# Patient Record
Sex: MALE | Race: WHITE
[De-identification: names, ages, dates, MRNs, and addresses within clinical notes are randomized per-mention and may not be internally consistent; named-entity substitution may affect disease eponyms.]

---

## 2018-05-19 ENCOUNTER — HOSPITAL ENCOUNTER (INPATIENT)
Dept: HOSPITAL 4 - SED | Age: 28
LOS: 1 days | Discharge: HOME | DRG: 301 | End: 2018-05-20
Attending: INTERNAL MEDICINE | Admitting: INTERNAL MEDICINE
Payer: COMMERCIAL

## 2018-05-19 VITALS — BODY MASS INDEX: 30.52 KG/M2 | HEIGHT: 71 IN | WEIGHT: 218 LBS

## 2018-05-19 VITALS — SYSTOLIC BLOOD PRESSURE: 152 MMHG

## 2018-05-19 DIAGNOSIS — E66.9: ICD-10-CM
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
## 2018-05-19 DIAGNOSIS — Z79.01: ICD-10-CM

## 2018-05-19 DIAGNOSIS — I82.432: Primary | ICD-10-CM

## 2018-05-19 NOTE — NUR
-------------------------------------------------------------------------------

           *** Note ifeanyione in EDM - 05/19/18 at 2111 by [email or web address] ***            

-------------------------------------------------------------------------------

Patient triaged and placed in waiting room. VSS and patient appears in no acute 
distress at this time. Accompanied by Daughter, awaiting available bed, and MD 
notified of need for MSE.

## 2018-05-19 NOTE — NUR
Patient triaged and placed in waiting room. VSS and patient appears in no acute 
distress at this time. Accompanied by girlfriend, awaiting available bed, and 
MD notified of need for MSE.

## 2018-05-20 VITALS — SYSTOLIC BLOOD PRESSURE: 122 MMHG

## 2018-05-20 VITALS — SYSTOLIC BLOOD PRESSURE: 100 MMHG

## 2018-05-20 VITALS — SYSTOLIC BLOOD PRESSURE: 126 MMHG

## 2018-05-20 VITALS — SYSTOLIC BLOOD PRESSURE: 120 MMHG

## 2018-05-20 LAB
ALBUMIN SERPL BCP-MCNC: 4.1 G/DL (ref 3.4–4.8)
ALT SERPL W P-5'-P-CCNC: 50 U/L (ref 12–78)
ANION GAP SERPL CALCULATED.3IONS-SCNC: 3 MMOL/L (ref 5–15)
AST SERPL W P-5'-P-CCNC: 27 U/L (ref 10–37)
BASOPHILS # BLD AUTO: 0.1 K/UL (ref 0–0.2)
BASOPHILS NFR BLD AUTO: 0.8 % (ref 0–2)
BILIRUB SERPL-MCNC: 0.3 MG/DL (ref 0–1)
BUN SERPL-MCNC: 17 MG/DL (ref 8–21)
CALCIUM SERPL-MCNC: 9.6 MG/DL (ref 8.4–11)
CHLORIDE SERPL-SCNC: 106 MMOL/L (ref 98–107)
CREAT SERPL-MCNC: 0.99 MG/DL (ref 0.55–1.3)
EOSINOPHIL # BLD AUTO: 0.2 K/UL (ref 0–0.4)
EOSINOPHIL NFR BLD AUTO: 2.3 % (ref 0–4)
ERYTHROCYTE [DISTWIDTH] IN BLOOD BY AUTOMATED COUNT: 11.5 % (ref 9–15)
GFR SERPL CREATININE-BSD FRML MDRD: 118 ML/MIN (ref 90–?)
GLUCOSE SERPL-MCNC: 112 MG/DL (ref 70–99)
HCT VFR BLD AUTO: 46.1 % (ref 36–54)
HGB BLD-MCNC: 15.9 G/DL (ref 14–18)
INR PPP: 1 (ref 0.8–1.2)
LYMPHOCYTES # BLD AUTO: 2.5 K/UL (ref 1–5.5)
LYMPHOCYTES NFR BLD AUTO: 37.6 % (ref 20.5–51.5)
MCH RBC QN AUTO: 31 PG (ref 27–31)
MCHC RBC AUTO-ENTMCNC: 35 % (ref 32–36)
MCV RBC AUTO: 89 FL (ref 79–98)
MONOCYTES # BLD MANUAL: 0.6 K/UL (ref 0–1)
MONOCYTES # BLD MANUAL: 8.5 % (ref 1.7–9.3)
NEUTROPHILS # BLD AUTO: 3.2 K/UL (ref 1.8–7.7)
NEUTROPHILS NFR BLD AUTO: 50.8 % (ref 40–70)
PLATELET # BLD AUTO: 222 K/UL (ref 130–430)
POTASSIUM SERPL-SCNC: 3.8 MMOL/L (ref 3.5–5.1)
PROTHROMBIN TIME: 9.9 SECS (ref 9.5–12.5)
RBC # BLD AUTO: 5.2 MIL/UL (ref 4.2–6.2)
SODIUM SERPLBLD-SCNC: 140 MMOL/L (ref 136–145)
WBC # BLD AUTO: 6.6 K/UL (ref 4.8–10.8)

## 2018-05-20 NOTE — NUR
Patient will be admitted to care of Dr Cox.  Admitted to med/surg unit.  Will 
go to room 109c.  Belongings list completed.  Summary report printed. Report 
will be given at bedside.

## 2018-05-20 NOTE — NUR
OPENING NOTE



RECEIVED PT REPORT FROM ED NURSE SUZAN RN. PT AOX4, BROUGHT TO ROOM 109-C VIA GURNEY. 
RECEIVED PT ON HOSPITAL BED, PT RESTING COMFORTABLY. VITAL SIGNS WNL. CHEST RISE EVEN AND 
UNLABORED. NO SOB NOTED. NO DISTRESS NOTED. PT DENIES PAIN AT THIS TIME PT HAS LEFT AC 20 
GAUGE SL. SO AND PATIENT'S FATHER AT BEDSIDE. SAFETY MEASURES IN PLACE. WILL CONTINUE TO 
MONITOR PT.

## 2018-05-20 NOTE — NUR
RN ROUNDS 



PT CURRENTLY RESTING IN BED COMFORTABLY WITH EYES CLOSED. GIRLFRIEND AND DAD AT BEDSIDE. 
CHEST RISE EVEN AND UNLABORED. NO SOB NOTED. NO RESPIRATORY DISTRESS NOTED. SAFETY MEASURES 
IN PLACE BED IN LOWEST POSITION, BED WHEELS LOCKED, BED ALARM ON, BED RAILS UP X2, CALL 
LIGHT WITHIN REACH, BEDSIDE TABLE WITHIN REACH. NO FURTHER NEEDS AT THIS TIME. WILL CONTINUE 
TO MONITOR.

## 2018-05-20 NOTE — NUR
ROUNDS

DR. ARREDONDO SAW PT.  PT IS GETTING MEDICATION FROM OUTSIDE PHARMACY TO GET DISCHARGED.  PT IS 
IN BED WATCHING TV.  NO DISTRESS NOTED.  ALL NEEDS MET AT THIS TIME. SAFETY MEASURES BED IS 
TO LOWEST POSITION, SIDE RIALS UPX2, CALL LIGHT WITHIN REACH.  WILL CONTINUE TO MONITOR.

## 2018-05-20 NOTE — NUR
-------------------------------------------------------------------------------

           *** Note dudley in [facility] - 05/20/18 at 0038 by [email or web address] ***            

-------------------------------------------------------------------------------

Patient resting quietly.  No acute distress noted.  Vital signs within normal 
range.

## 2018-05-20 NOTE — NUR
CLOSING NOTE 



WILL ENDORSE PT REPORT TO DAY SHIFT NURSE. PT CURRENTLY RESTING IN BED COMFORTABLY. CHEST 
RISE EVEN AND UNLABORED. NO SOB NOTED. NO RESPIRATORY DISTRESS NOTED. ALL SCHEDULED 
MEDICATIONS ADMINISTERED AS ORDERED. PT TOLERATED WELL. SAFETY MEASURES IN PLACE BED IN 
LOWEST POSITION, BED WHEELS LOCKED, BED ALARM ON, BED RAILS UP X2, CALL LIGHT WITHIN REACH, 
BEDSIDE TABLE WITHIN REACH. ALL NEEDS MET THROUGHOUT SHIFT. WILL CONTINUE TO MONITOR.

## 2018-05-20 NOTE — NUR
ADMISSION NOTE

Received patient from ER via [facility], received report from DORCAS MARES. Patient admitted with 
diagnosis of DVT. Patient oriented to hospital routine, call light, toileting and 
safety-patient verbalized understanding.

## 2018-05-20 NOTE — NUR
ROUNDS

PT IS IN BED WATCHING TV.  NO SOB NOTED.  NO DISTRESS NOTED.  SAFETY MEASURES IN PLACE, BED 
TO LOWEST POSITION, SIDE RAILS UPX2, CALL LIGHT WITHIN REACH.

## 2018-05-20 NOTE — NUR
MORNING MEDICATION 

ADMINISTERED LOVENOX.  PT IS ASKING WHEN THE DOCTOR WILL COME AND WANT TO GO HOME.  
EXPLAINED TO PT MD ROUNDS ARE NOT AT A SET TIME.  PT IS RESTING IN BED WITH VISITOR AT 
BEDSIDE.  NO SOB NOTED.  PT STATES PAIN IS 4/10, TOLERABLE FOR PT.  SAFETY MEASURES IN 
PLACE, BED TO LOWEST POSITION, SIDE RAILS UPX2, CALL LIGHT WITHIN REACH.  WILL CONTINUE TO 
MONITOR.

## 2018-05-20 NOTE — NUR
# 20 gauge angiocath placed to lac.  Use of asceptic technique.  Opsite placed 
over site.  Blood return noted.  Blood for lab drawn from site.  Flushed with 
10 cc of normal saline.  No evidence of infiltration noted.  Patient tolerated 
well.

## 2018-05-20 NOTE — NUR
D/C Patient

Patient given medication reconciliation form and D/C instructions. Exit Care provided. 
Patient verbalized understanding. MD discussed with patient the results and treatment 
provided. Ambulatory with steady gait for discharge to home. Patient in stable condition, ID 
band removed. IV catheter removed, intact and dressing applied, no active bleeding. Patient 
educated on pain management. All belongings sent with patient.

## 2018-05-20 NOTE — NUR
OPENING NOTE

RECEIVED REPORT FROM DAY SHIFT NURSE.  PT IS SLEEPING IN BED.  PT IS ON RA, NO SOB NOTED.  
IV SITE IS SALINE LOCK.  NO DISTRESS NOTED.  SAFETY MEASURES BED IS TO LOWEST POSITION, SIDE 
RIALS UPX3, CALL LIGHT WITHIN REACH.  WILL CONTINUE TO MONITOR.

## 2018-05-20 NOTE — NUR
RN ROUNDS 



PT CURRENTLY RESTING IN BED COMFORTABLY WITH EYES CLOSED. PT'S DAD AND SIGNIFICANT OTHER AT 
BEDSIDE. CHEST RISE EVEN AND UNLABORED. NO SOB NOTED. NO RESPIRATORY DISTRESS NOTED. SAFETY 
MEASURES IN PLACE BED IN LOWEST POSITION, BED WHEELS LOCKED, BED ALARM ON, BED RAILS UP X2, 
CALL LIGHT WITHIN REACH, BEDSIDE TABLE WITHIN REACH. NO FURTHER NEEDS AT THIS TIME. WILL 
CONTINUE TO MONITOR.

## 2018-05-20 NOTE — NUR
CONSULTATION CALLED



REASON FOR CONSULTATION: DVT

WAS CONSULT CALLED? Y

PERSON WHO WAS NOTIFIED: Juan

CONSULTING PHYSICIAN: Charlotte Nicole

CONSULTANT PHONE NUMBER: [phone]

ORDERING PHYSICIAN: Dr. Cox

## 2018-05-20 NOTE — NUR
ROUNDS

PT IS RESTING IN BED WITH VISITOR AT BEDSIDE.  NO SOB NOTED.  SAFETY MEASURES IN PLACE, BED 
TO LOWEST POSITION, SIDE RAILS UPX2, CALL LIGHT WITHIN REACH.  WILL CONTINUE TO MONITOR.

## 2018-05-20 NOTE — NUR
ROUNDS

PT IS WATCHING TV.  NO DISTRESS NOTED.  PT STATES PAIN IS 5/10, TOLERABLE FOR PT.  SAFETY 
MEASURES IN PLACE, BED TO LOWEST POSITION, SIDE RAILS UPX2, CALL LIGHT WITHIN REACH.  WILL 
CONTINUE TO MONITOR.